# Patient Record
Sex: FEMALE | Race: WHITE | NOT HISPANIC OR LATINO | URBAN - METROPOLITAN AREA
[De-identification: names, ages, dates, MRNs, and addresses within clinical notes are randomized per-mention and may not be internally consistent; named-entity substitution may affect disease eponyms.]

---

## 2017-05-25 ENCOUNTER — INPATIENT (INPATIENT)
Facility: HOSPITAL | Age: 57
LOS: 0 days | Discharge: HOME | End: 2017-05-25
Attending: INTERNAL MEDICINE

## 2017-06-28 DIAGNOSIS — E03.9 HYPOTHYROIDISM, UNSPECIFIED: ICD-10-CM

## 2017-06-28 DIAGNOSIS — I10 ESSENTIAL (PRIMARY) HYPERTENSION: ICD-10-CM

## 2017-06-28 DIAGNOSIS — R42 DIZZINESS AND GIDDINESS: ICD-10-CM

## 2017-06-28 DIAGNOSIS — R11.0 NAUSEA: ICD-10-CM

## 2017-06-28 DIAGNOSIS — E78.5 HYPERLIPIDEMIA, UNSPECIFIED: ICD-10-CM

## 2017-06-28 DIAGNOSIS — E11.649 TYPE 2 DIABETES MELLITUS WITH HYPOGLYCEMIA WITHOUT COMA: ICD-10-CM

## 2019-11-19 PROBLEM — Z00.00 ENCOUNTER FOR PREVENTIVE HEALTH EXAMINATION: Status: ACTIVE | Noted: 2019-11-19

## 2019-11-25 ENCOUNTER — APPOINTMENT (OUTPATIENT)
Dept: OBGYN | Facility: CLINIC | Age: 59
End: 2019-11-25
Payer: COMMERCIAL

## 2019-11-25 VITALS
DIASTOLIC BLOOD PRESSURE: 80 MMHG | SYSTOLIC BLOOD PRESSURE: 130 MMHG | BODY MASS INDEX: 30.02 KG/M2 | HEIGHT: 61 IN | WEIGHT: 159 LBS

## 2019-11-25 DIAGNOSIS — R87.611 ATYPICAL SQUAMOUS CELLS CANNOT EXCLUDE HIGH GRADE SQUAMOUS INTRAEPITHELIAL LESION ON CYTOLOGIC SMEAR OF CERVIX (ASC-H): ICD-10-CM

## 2019-11-25 DIAGNOSIS — Z86.19 PERSONAL HISTORY OF OTHER INFECTIOUS AND PARASITIC DISEASES: ICD-10-CM

## 2019-11-25 DIAGNOSIS — R87.629 UNSPECIFIED ABNORMAL CYTOLOGICAL FINDINGS IN SPECIMENS FROM VAGINA: ICD-10-CM

## 2019-11-25 PROCEDURE — 99396 PREV VISIT EST AGE 40-64: CPT

## 2019-11-25 NOTE — PHYSICAL EXAM
[Awake] : awake [Alert] : alert [Acute Distress] : no acute distress [Mass] : no breast mass [Nipple Discharge] : no nipple discharge [Axillary LAD] : no axillary lymphadenopathy [Soft] : soft [Tender] : non tender [Oriented x3] : oriented to person, place, and time [Atrophy] : atrophy [Normal] : uterus [No Bleeding] : there was no active vaginal bleeding [Uterine Adnexae] : were not tender and not enlarged

## 2020-05-02 RX ORDER — CLOTRIMAZOLE 10 MG/G
1 CREAM TOPICAL TWICE DAILY
Qty: 1 | Refills: 0 | Status: ACTIVE | COMMUNITY
Start: 2020-05-02 | End: 1900-01-01

## 2020-08-06 DIAGNOSIS — N76.2 ACUTE VULVITIS: ICD-10-CM

## 2020-08-06 RX ORDER — CLOTRIMAZOLE AND BETAMETHASONE DIPROPIONATE 10; .5 MG/G; MG/G
1-0.05 CREAM TOPICAL TWICE DAILY
Qty: 1 | Refills: 0 | Status: ACTIVE | COMMUNITY
Start: 2020-08-06 | End: 1900-01-01

## 2020-12-01 ENCOUNTER — APPOINTMENT (OUTPATIENT)
Dept: OBGYN | Facility: CLINIC | Age: 60
End: 2020-12-01
Payer: COMMERCIAL

## 2020-12-01 VITALS
WEIGHT: 151 LBS | BODY MASS INDEX: 28.53 KG/M2 | TEMPERATURE: 97.2 F | DIASTOLIC BLOOD PRESSURE: 72 MMHG | SYSTOLIC BLOOD PRESSURE: 126 MMHG

## 2020-12-01 DIAGNOSIS — Z01.419 ENCOUNTER FOR GYNECOLOGICAL EXAMINATION (GENERAL) (ROUTINE) W/OUT ABNORMAL FINDINGS: ICD-10-CM

## 2020-12-01 PROCEDURE — 99072 ADDL SUPL MATRL&STAF TM PHE: CPT

## 2020-12-01 PROCEDURE — 99396 PREV VISIT EST AGE 40-64: CPT

## 2020-12-01 NOTE — DISCUSSION/SUMMARY
[FreeTextEntry1] : Pap done \par Self breast exam stressed\par Prescribed bilateral screening mammogram\par Follow-up yearly or as needed\par \par

## 2020-12-01 NOTE — HISTORY OF PRESENT ILLNESS
[FreeTextEntry1] : Patient is 60 years old para 2-0-0-2 last menstrual period 2011\par She denies postmenopausal bleeding and is presently without complaints no fever/no bruising/no pain/no decreased eating/drinking/no petechia/no vomiting/no chills/no scaly patches on skin

## 2021-05-04 RX ORDER — CLOTRIMAZOLE AND BETAMETHASONE DIPROPIONATE 10; .5 MG/G; MG/G
1-0.05 CREAM TOPICAL TWICE DAILY
Qty: 1 | Refills: 0 | Status: ACTIVE | COMMUNITY
Start: 2021-05-04 | End: 1900-01-01

## 2021-07-12 ENCOUNTER — APPOINTMENT (OUTPATIENT)
Dept: ENDOCRINOLOGY | Facility: CLINIC | Age: 61
End: 2021-07-12
Payer: COMMERCIAL

## 2021-07-12 VITALS
BODY MASS INDEX: 28.32 KG/M2 | SYSTOLIC BLOOD PRESSURE: 128 MMHG | TEMPERATURE: 97.6 F | DIASTOLIC BLOOD PRESSURE: 70 MMHG | HEART RATE: 70 BPM | HEIGHT: 61 IN | OXYGEN SATURATION: 98 % | WEIGHT: 150 LBS

## 2021-07-12 PROCEDURE — 99203 OFFICE O/P NEW LOW 30 MIN: CPT

## 2021-07-12 PROCEDURE — 99072 ADDL SUPL MATRL&STAF TM PHE: CPT

## 2021-07-12 NOTE — DATA REVIEWED
[FreeTextEntry1] : 6/10/21:  Tg 160 glucose 101 HBA1c 5.9% crea 0.83    GFR 76  GGT 96  ALK phos 68 AST 11 ALt 17 TSH 5.18  FT4 1.4  25 vit D 43

## 2021-07-12 NOTE — HISTORY OF PRESENT ILLNESS
[FreeTextEntry1] : 60 year old patient who present for evaluation of hypothyroidism , DL, and prediabetes \par \par thyroid history : \par - 9 months after she gave birth was diagnosed with hypothyroidism , since that time she is on synthroid ( brand )100 mcg daily , dose was changed 3 months ago from 112 to 100 mcg .\par Now feel good , euthyroid , no fatigue, weight gain, cold intolerance, no constipation. \par \par DL : high LDL and Tg was on statin but stopped it as she started Keto diet , lost weight and her numbers ( Tg got better m, LDL increased) she i snot convinced that her DL will cause her any heart disease and prefer to stay off.\par \par prediabetes \par previous HBa1c 6.4%

## 2021-07-12 NOTE — PHYSICAL EXAM
[Alert] : alert [Healthy Appearance] : healthy appearance [No Acute Distress] : no acute distress [No Proptosis] : no proptosis [No Lid Lag] : no lid lag [Thyroid Not Enlarged] : the thyroid was not enlarged [No Thyroid Nodules] : no palpable thyroid nodules [No Respiratory Distress] : no respiratory distress [No Accessory Muscle Use] : no accessory muscle use [Clear to Auscultation] : lungs were clear to auscultation bilaterally [Normal S1, S2] : normal S1 and S2 [Regular Rhythm] : with a regular rhythm [No Edema] : no peripheral edema [Not Tender] : non-tender [Not Distended] : not distended [Soft] : abdomen soft [No CVA Tenderness] : no ~M costovertebral angle tenderness [No Stigmata of Cushings Syndrome] : no stigmata of Cushings Syndrome [Cranial Nerves Intact] : cranial nerves 2-12 were intact [No Motor Deficits] : the motor exam was normal [No Tremors] : no tremors [Oriented x3] : oriented to person, place, and time

## 2021-07-12 NOTE — ASSESSMENT
[Carbohydrate Consistent Diet] : carbohydrate consistent diet [Importance of Diet and Exercise] : importance of diet and exercise to improve glycemic control, achieve weight loss and improve cardiovascular health [Exercise/Effect on Glucose] : exercise/effect on glucose [Self Monitoring of Blood Glucose] : self monitoring of blood glucose [FreeTextEntry1] : 60 year old patient who present for evaluation of hypothyroidism , DL, and prediabetes \par \par \par \par #hypothyroidism \par - on synthroid 100 mcg daily ( brand name ) , compliant , no biotin use, TSH 5.18 normal Ft4 , clinically euthyroid , dose lowered previously for palpitations, patient prefer to stay on current dose , will recheck labs before next visit and adjust \par \par #DL : high LDL and Tg was on statin but stopped it as she started Keto diet , lost weight and her numbers ( Tg got better m, LDL increased) she i snot convinced that her DL will cause her any heart disease and prefer to stay off.\par state she had Ct Coronaries done , will request from dr Desai office . \par advised about risk of high cholesterol with keto diet and advised to avoid saturated fat \par refuse to go back on statin for now \par state that with weight loss her fatty liver also is better, ( has high GGT , unclear etiology )\par \par # prediabetes : hba1c 5.9% used to take metformin but stopped , prefer not to go back on it \par advised to continue low carb diet, healthy lifestyle and exercise

## 2021-12-14 ENCOUNTER — APPOINTMENT (OUTPATIENT)
Dept: ENDOCRINOLOGY | Facility: CLINIC | Age: 61
End: 2021-12-14
Payer: COMMERCIAL

## 2021-12-14 VITALS
OXYGEN SATURATION: 98 % | BODY MASS INDEX: 28.89 KG/M2 | HEIGHT: 61 IN | HEART RATE: 59 BPM | TEMPERATURE: 97.2 F | DIASTOLIC BLOOD PRESSURE: 70 MMHG | WEIGHT: 153 LBS | SYSTOLIC BLOOD PRESSURE: 120 MMHG

## 2021-12-14 PROCEDURE — 99213 OFFICE O/P EST LOW 20 MIN: CPT

## 2021-12-14 NOTE — DATA REVIEWED
[FreeTextEntry1] : 6/10/21:  Tg 160 glucose 101 HBA1c 5.9% crea 0.83    GFR 76  GGT 96  ALK phos 68 AST 11 ALt 17 TSH 5.18  FT4 1.4  25 vit D 43 \par \par 12/2/21: tsh 1.81  Ft4 1.6  TT3 78 hb 12.6 A1c 6% crea 0.71 GFR 91  tg 174 ALT 30 AST 14

## 2021-12-14 NOTE — ASSESSMENT
[Carbohydrate Consistent Diet] : carbohydrate consistent diet [Importance of Diet and Exercise] : importance of diet and exercise to improve glycemic control, achieve weight loss and improve cardiovascular health [Exercise/Effect on Glucose] : exercise/effect on glucose [Self Monitoring of Blood Glucose] : self monitoring of blood glucose [FreeTextEntry1] : 60 year old patient who present for evaluation of hypothyroidism , DL, and prediabetes \par \par \par \par #hypothyroidism \par - on synthroid 100 mcg daily ( brand name ) , compliant , no biotin use, tft's ok continue same dose \par \par #DL : high LDL and Tg was on statin but stopped it as she started Keto diet , lost weight and her numbers got better but now worse she is not convinced that her LDL will cause her any heart disease and prefer to stay off.\par state she had Ct Coronaries done  with PCP and will have another one done soon \par \par refuse to go back on statin for now , is ok using vascepa , doesn't want to use Zetia \par \par # prediabetes : hba1c 6% used to take metformin but stopped , prefer not to go back on it \par advised to continue low carb diet, healthy lifestyle and exercise

## 2021-12-14 NOTE — HISTORY OF PRESENT ILLNESS
[FreeTextEntry1] : 61 year old patient who present for evaluation of hypothyroidism , DL, and prediabetes \par \par thyroid history : \par - 9 months after she gave birth was diagnosed with hypothyroidism , since that time she is on synthroid ( brand )100 mcg daily , dose was changed 3 months ago from 112 to 100 mcg .\par Now feel good , euthyroid , no fatigue, weight gain, cold intolerance, no constipation. \par \par DL : high LDL and Tg was on statin but stopped it as she started Keto diet , she is not convinced that her LDL will cause her any heart disease and prefer to stay off.\par \par prediabetes \par previous HBa1c 6.4% \par \par 12/2021: patient feel ok she stopped her red Rice yeast  and fish oil and was not compliant with diet fully continue te refuse statin \par remain on Synthroid 100 mcg daily

## 2022-01-13 ENCOUNTER — APPOINTMENT (OUTPATIENT)
Dept: OBGYN | Facility: CLINIC | Age: 62
End: 2022-01-13
Payer: COMMERCIAL

## 2022-01-13 VITALS — BODY MASS INDEX: 28.91 KG/M2 | DIASTOLIC BLOOD PRESSURE: 74 MMHG | SYSTOLIC BLOOD PRESSURE: 118 MMHG | WEIGHT: 153 LBS

## 2022-01-13 DIAGNOSIS — Z01.419 ENCOUNTER FOR GYNECOLOGICAL EXAMINATION (GENERAL) (ROUTINE) W/OUT ABNORMAL FINDINGS: ICD-10-CM

## 2022-01-13 PROCEDURE — 99396 PREV VISIT EST AGE 40-64: CPT

## 2022-01-13 RX ORDER — CLOTRIMAZOLE AND BETAMETHASONE DIPROPIONATE 10; .5 MG/G; MG/G
1-0.05 CREAM TOPICAL TWICE DAILY
Qty: 1 | Refills: 1 | Status: ACTIVE | COMMUNITY
Start: 2022-01-13 | End: 1900-01-01

## 2022-01-13 NOTE — DISCUSSION/SUMMARY
[FreeTextEntry1] : Pap done\par Self breast exam stressed\par Prescribed yearly bilateral screening mammogram and bilateral breast ultrasound\par Follow-up yearly or as needed

## 2022-01-13 NOTE — HISTORY OF PRESENT ILLNESS
[FreeTextEntry1] : Patient is 61 years old para 2-0-0-2 last menstrual period 2011.\par She denies postmenopausal bleeding and is presently without complaints.

## 2022-02-15 RX ORDER — CLOTRIMAZOLE AND BETAMETHASONE DIPROPIONATE 10; .5 MG/G; MG/G
1-0.05 CREAM TOPICAL TWICE DAILY
Qty: 1 | Refills: 1 | Status: ACTIVE | COMMUNITY
Start: 2022-02-15 | End: 1900-01-01

## 2022-03-31 ENCOUNTER — OUTPATIENT (OUTPATIENT)
Dept: OUTPATIENT SERVICES | Facility: HOSPITAL | Age: 62
LOS: 1 days | Discharge: HOME | End: 2022-03-31
Payer: COMMERCIAL

## 2022-03-31 ENCOUNTER — RESULT REVIEW (OUTPATIENT)
Age: 62
End: 2022-03-31

## 2022-03-31 DIAGNOSIS — R07.9 CHEST PAIN, UNSPECIFIED: ICD-10-CM

## 2022-03-31 PROCEDURE — 75574 CT ANGIO HRT W/3D IMAGE: CPT | Mod: 26

## 2022-06-20 ENCOUNTER — APPOINTMENT (OUTPATIENT)
Dept: ENDOCRINOLOGY | Facility: CLINIC | Age: 62
End: 2022-06-20

## 2022-07-24 ENCOUNTER — RX RENEWAL (OUTPATIENT)
Age: 62
End: 2022-07-24

## 2022-10-06 ENCOUNTER — APPOINTMENT (OUTPATIENT)
Dept: ENDOCRINOLOGY | Facility: CLINIC | Age: 62
End: 2022-10-06

## 2022-10-06 VITALS
WEIGHT: 155 LBS | BODY MASS INDEX: 29.27 KG/M2 | SYSTOLIC BLOOD PRESSURE: 138 MMHG | DIASTOLIC BLOOD PRESSURE: 68 MMHG | HEIGHT: 61 IN

## 2022-10-06 PROCEDURE — 99213 OFFICE O/P EST LOW 20 MIN: CPT

## 2022-10-06 NOTE — DISCUSSION/SUMMARY
[FreeTextEntry1] : patient seen at office ( has appointment with GYN ) states her BG on FS are running in 106-130 range and would like to restart metformin \par will restart metformin 500 mg daily

## 2022-10-07 NOTE — DATA REVIEWED
[FreeTextEntry1] : 6/10/21:  Tg 160 glucose 101 HBA1c 5.9% crea 0.83    GFR 76  GGT 96  ALK phos 68 AST 11 ALt 17 TSH 5.18  FT4 1.4  25 vit D 43 \par \par 12/2/21: tsh 1.81  Ft4 1.6  TT3 78 hb 12.6 A1c 6% crea 0.71 GFR 91  tg 174 ALT 30 AST 14 \par 9/2022: A1c 6.1% LDL 92 Tg 145 glucose 108 crea 0.69  GFR 97 TSH 1.1 25 vit D 51

## 2022-10-07 NOTE — ASSESSMENT
[Carbohydrate Consistent Diet] : carbohydrate consistent diet [Importance of Diet and Exercise] : importance of diet and exercise to improve glycemic control, achieve weight loss and improve cardiovascular health [Exercise/Effect on Glucose] : exercise/effect on glucose [Self Monitoring of Blood Glucose] : self monitoring of blood glucose [FreeTextEntry1] : 61 year old patient who present for evaluation of hypothyroidism , DL, and prediabetes \par \par \par \par #hypothyroidism \par - on Synthroid 100 mcg daily ( brand name ) , compliant , no biotin use, tft's ok continue same dose \par \par #DL : high LDL and Tg was on statin but stopped it as she started Keto diet , lost weight and her numbers got better but then worse, was started on crestor 20 mg daily as of 5/2022  and now LDL better , also on vascepa \par \par \par \par \par # prediabetes : hba1c 6.1% on metformin 500 mg BID and diet and lifestyle changes

## 2022-10-07 NOTE — HISTORY OF PRESENT ILLNESS
[FreeTextEntry1] : 61 year old patient who present for evaluation of hypothyroidism , DL, and prediabetes \par \par thyroid history : \par - 9 months after she gave birth was diagnosed with hypothyroidism , since that time she is on synthroid ( brand )100 mcg daily , dose was changed 3 months ago from 112 to 100 mcg .\par Now feel good , euthyroid , no fatigue, weight gain, cold intolerance, no constipation. \par \par DL : high LDL and Tg was on statin but stopped it as she started Keto diet .\par restarted now on crestor 20 mg daily after seeing cardiology \par \par prediabetes\par on metformin 500 mg BID  \par previous HBa1c 6.4% \par \par

## 2023-01-17 ENCOUNTER — APPOINTMENT (OUTPATIENT)
Dept: OBGYN | Facility: CLINIC | Age: 63
End: 2023-01-17
Payer: COMMERCIAL

## 2023-01-17 VITALS — SYSTOLIC BLOOD PRESSURE: 124 MMHG | WEIGHT: 162 LBS | DIASTOLIC BLOOD PRESSURE: 72 MMHG | BODY MASS INDEX: 30.61 KG/M2

## 2023-01-17 DIAGNOSIS — Z01.419 ENCOUNTER FOR GYNECOLOGICAL EXAMINATION (GENERAL) (ROUTINE) W/OUT ABNORMAL FINDINGS: ICD-10-CM

## 2023-01-17 PROCEDURE — 99396 PREV VISIT EST AGE 40-64: CPT

## 2023-01-17 NOTE — HISTORY OF PRESENT ILLNESS
[FreeTextEntry1] : Patient is 62 years old para 2-0-0-2 last menstrual period 2011.\par She denies postmenopausal bleeding and is presently without complaints

## 2023-03-14 ENCOUNTER — APPOINTMENT (OUTPATIENT)
Dept: ENDOCRINOLOGY | Facility: CLINIC | Age: 63
End: 2023-03-14
Payer: COMMERCIAL

## 2023-03-14 VITALS
HEIGHT: 61 IN | TEMPERATURE: 97.4 F | BODY MASS INDEX: 30.96 KG/M2 | OXYGEN SATURATION: 98 % | HEART RATE: 62 BPM | WEIGHT: 164 LBS | DIASTOLIC BLOOD PRESSURE: 80 MMHG | SYSTOLIC BLOOD PRESSURE: 138 MMHG

## 2023-03-14 PROCEDURE — 99213 OFFICE O/P EST LOW 20 MIN: CPT

## 2023-03-14 NOTE — HISTORY OF PRESENT ILLNESS
[FreeTextEntry1] : 62 year old patient who present for evaluation of hypothyroidism , DL, and prediabetes \par \par #thyroid history : \par - 9 months after she gave birth was diagnosed with hypothyroidism , since that time she is on Synthroid ( brand )100 mcg daily \par Now feel good , euthyroid , no fatigue, + weight gain, cold intolerance, no constipation. \par \par #DL : controlled now on crestor 20 mg and vascepa \par \par #prediabetes\par on metformin 500 mg BID  \par  \par \par

## 2023-03-14 NOTE — DATA REVIEWED
[FreeTextEntry1] : 6/10/21:  Tg 160 glucose 101 HBA1c 5.9% crea 0.83    GFR 76  GGT 96  ALK phos 68 AST 11 ALt 17 TSH 5.18  FT4 1.4  25 vit D 43 \par \par 12/2/21: tsh 1.81  Ft4 1.6  TT3 78 hb 12.6 A1c 6% crea 0.71 GFR 91  tg 174 ALT 30 AST 14 \par 9/2022: A1c 6.1% LDL 92 Tg 145 glucose 108 crea 0.69  GFR 97 TSH 1.1 25 vit D 51 \par \par 3/2023:  A1c 6.4% glucose 134   crea 0.72  GFR 93  crea 0.72   AST 11  ALT 18 LDL 74  Tg 112   \par U/ A positive no bacteria ?  TSH 1.22 ft4 1.6 \par

## 2023-03-14 NOTE — ASSESSMENT
[Carbohydrate Consistent Diet] : carbohydrate consistent diet [Importance of Diet and Exercise] : importance of diet and exercise to improve glycemic control, achieve weight loss and improve cardiovascular health [Exercise/Effect on Glucose] : exercise/effect on glucose [Self Monitoring of Blood Glucose] : self monitoring of blood glucose [FreeTextEntry1] : 62 year old patient who present for evaluation of hypothyroidism , DL, and prediabetes \par \par \par \par #hypothyroidism \par - on Synthroid 100 mcg daily ( brand name ) , compliant , no biotin use, tft's ok continue same dose \par \par #DL : improved  on crestor 20 mg daily   and now LDL better , also on vascepa \par \par \par \par \par # prediabetes : hba1c 6.4% increase metformin to 500 mg in am and 1000 mg in pm  diet and lifestyle changes \par \par # U?a + albuminuria ? patient will follow up with PCP if having symptoms to have U/A and culture done

## 2023-03-14 NOTE — REVIEW OF SYSTEMS
Attempted to call report. Placed on hold. Will attempt to call report again shortly. [All other systems negative] : All other systems negative [Fatigue] : no fatigue [Recent Weight Gain (___ Lbs)] : recent weight gain: [unfilled] lbs [Visual Field Defect] : no visual field defect [Blurred Vision] : no blurred vision [Dysphagia] : no dysphagia [Neck Pain] : no neck pain [Dysphonia] : no dysphonia [Chest Pain] : no chest pain [Palpitations] : no palpitations [Fast Heart Rate] : heart rate is not fast [Lower Ext Edema] : no lower extremity edema [Shortness Of Breath] : no shortness of breath [SOB on Exertion] : no shortness of breath on exertion [Nausea] : no nausea [Constipation] : no constipation [Vomiting] : no vomiting [As Noted in HPI] : as noted in HPI

## 2023-06-30 RX ORDER — LEVOTHYROXINE SODIUM 0.1 MG/1
100 TABLET ORAL
Qty: 90 | Refills: 1 | Status: ACTIVE | COMMUNITY
Start: 2021-07-12 | End: 1900-01-01

## 2023-08-15 ENCOUNTER — APPOINTMENT (OUTPATIENT)
Dept: ENDOCRINOLOGY | Facility: CLINIC | Age: 63
End: 2023-08-15
Payer: COMMERCIAL

## 2023-08-15 VITALS
HEART RATE: 60 BPM | DIASTOLIC BLOOD PRESSURE: 74 MMHG | SYSTOLIC BLOOD PRESSURE: 128 MMHG | HEIGHT: 61 IN | OXYGEN SATURATION: 98 % | BODY MASS INDEX: 30.4 KG/M2 | WEIGHT: 161 LBS

## 2023-08-15 PROCEDURE — 99213 OFFICE O/P EST LOW 20 MIN: CPT

## 2023-08-15 RX ORDER — ICOSAPENT ETHYL 1 G/1
1 CAPSULE ORAL
Qty: 360 | Refills: 2 | Status: ACTIVE | COMMUNITY
Start: 2021-12-14 | End: 1900-01-01

## 2023-08-15 NOTE — PHYSICAL EXAM
[Alert] : alert [Healthy Appearance] : healthy appearance [No Acute Distress] : no acute distress [No Proptosis] : no proptosis [No Lid Lag] : no lid lag [Thyroid Not Enlarged] : the thyroid was not enlarged [No Thyroid Nodules] : no palpable thyroid nodules [No Respiratory Distress] : no respiratory distress [No Accessory Muscle Use] : no accessory muscle use [Clear to Auscultation] : lungs were clear to auscultation bilaterally [Normal S1, S2] : normal S1 and S2 [No Murmurs] : no murmurs [Regular Rhythm] : with a regular rhythm [No Edema] : no peripheral edema [No CVA Tenderness] : no ~M costovertebral angle tenderness [No Stigmata of Cushings Syndrome] : no stigmata of Cushings Syndrome [Cranial Nerves Intact] : cranial nerves 2-12 were intact [No Motor Deficits] : the motor exam was normal [No Tremors] : no tremors [Oriented x3] : oriented to person, place, and time

## 2023-08-15 NOTE — DATA REVIEWED
[FreeTextEntry1] : 6/10/21:  Tg 160 glucose 101 HBA1c 5.9% crea 0.83    GFR 76  GGT 96  ALK phos 68 AST 11 ALt 17 TSH 5.18  FT4 1.4  25 vit D 43   12/2/21: tsh 1.81  Ft4 1.6  TT3 78 hb 12.6 A1c 6% crea 0.71 GFR 91  tg 174 ALT 30 AST 14  9/2022: A1c 6.1% LDL 92 Tg 145 glucose 108 crea 0.69  GFR 97 TSH 1.1 25 vit D 51   3/2023:  A1c 6.4% glucose 134   crea 0.72  GFR 93  crea 0.72   AST 11  ALT 18 LDL 74  Tg 112    U/ A positive no bacteria ?  TSH 1.22 ft4 1.6   8/2023: A1c 6.3% LDL 86  Tg 159 glucose 125 crea 0.72   GFR 93

## 2023-08-15 NOTE — HISTORY OF PRESENT ILLNESS
[FreeTextEntry1] : 62 year old patient who present for evaluation of hypothyroidism , DL, and prediabetes   #thyroid history :  - 9 months after she gave birth was diagnosed with hypothyroidism , since that time she is on Synthroid ( brand )100 mcg daily  Now feel good , euthyroid , no fatigue, no weight gain, cold intolerance, no constipation.   #DL : controlled now on crestor 20 mg and vascepa   #prediabetes on metformin 500 mg ER 1 in am and 2 in pm

## 2023-08-15 NOTE — REVIEW OF SYSTEMS
[Recent Weight Gain (___ Lbs)] : recent weight gain: [unfilled] lbs [As Noted in HPI] : as noted in HPI [All other systems negative] : All other systems negative [Fatigue] : no fatigue [Recent Weight Loss (___ Lbs)] : recent weight loss: [unfilled] lbs [Visual Field Defect] : no visual field defect [Blurred Vision] : no blurred vision [Dysphagia] : no dysphagia [Neck Pain] : no neck pain [Dysphonia] : no dysphonia [Chest Pain] : no chest pain [Palpitations] : no palpitations [Fast Heart Rate] : heart rate is not fast [Lower Ext Edema] : no lower extremity edema [Shortness Of Breath] : no shortness of breath [SOB on Exertion] : no shortness of breath on exertion [Nausea] : no nausea [Constipation] : no constipation [Vomiting] : no vomiting [Diarrhea] : diarrhea

## 2023-08-15 NOTE — ASSESSMENT
[Carbohydrate Consistent Diet] : carbohydrate consistent diet [Importance of Diet and Exercise] : importance of diet and exercise to improve glycemic control, achieve weight loss and improve cardiovascular health [Exercise/Effect on Glucose] : exercise/effect on glucose [Self Monitoring of Blood Glucose] : self monitoring of blood glucose [FreeTextEntry1] : 62-year-old patient who present for follow up evaluation of hypothyroidism , DL, and prediabetes     #hypothyroidism  - on Synthroid 100 mcg daily (brand name) , compliant, no biotin use, tft's not done with current labs continue same dose now, recheck with next BW   #DL : improved  on crestor 20 mg daily   and now LDL better , also on vascepa      # prediabetes : hba1c 6.3% on metformin to 500 mg in am and 1000 mg in pm diet and lifestyle changes, has diarrhea, discussed change in regimen, but prefer to stay the same now as traveling

## 2023-08-31 ENCOUNTER — RX RENEWAL (OUTPATIENT)
Age: 63
End: 2023-08-31

## 2023-11-13 ENCOUNTER — APPOINTMENT (OUTPATIENT)
Dept: ENDOCRINOLOGY | Facility: CLINIC | Age: 63
End: 2023-11-13
Payer: COMMERCIAL

## 2023-11-13 VITALS
DIASTOLIC BLOOD PRESSURE: 74 MMHG | WEIGHT: 169 LBS | HEIGHT: 61 IN | BODY MASS INDEX: 31.91 KG/M2 | SYSTOLIC BLOOD PRESSURE: 132 MMHG | OXYGEN SATURATION: 98 % | HEART RATE: 88 BPM

## 2023-11-13 DIAGNOSIS — R19.7 DIARRHEA, UNSPECIFIED: ICD-10-CM

## 2023-11-13 PROCEDURE — 99213 OFFICE O/P EST LOW 20 MIN: CPT

## 2024-03-07 ENCOUNTER — RX RENEWAL (OUTPATIENT)
Age: 64
End: 2024-03-07

## 2024-04-17 ENCOUNTER — APPOINTMENT (OUTPATIENT)
Dept: ENDOCRINOLOGY | Facility: CLINIC | Age: 64
End: 2024-04-17
Payer: COMMERCIAL

## 2024-04-17 VITALS
WEIGHT: 165 LBS | HEART RATE: 82 BPM | BODY MASS INDEX: 31.15 KG/M2 | OXYGEN SATURATION: 98 % | SYSTOLIC BLOOD PRESSURE: 135 MMHG | DIASTOLIC BLOOD PRESSURE: 70 MMHG | HEIGHT: 61 IN

## 2024-04-17 DIAGNOSIS — E78.5 HYPERLIPIDEMIA, UNSPECIFIED: ICD-10-CM

## 2024-04-17 DIAGNOSIS — E03.9 HYPOTHYROIDISM, UNSPECIFIED: ICD-10-CM

## 2024-04-17 DIAGNOSIS — R73.03 PREDIABETES.: ICD-10-CM

## 2024-04-17 PROCEDURE — 99213 OFFICE O/P EST LOW 20 MIN: CPT

## 2024-04-17 RX ORDER — METFORMIN ER 500 MG 500 MG/1
500 TABLET ORAL
Qty: 270 | Refills: 1 | Status: DISCONTINUED | COMMUNITY
Start: 2023-05-04 | End: 2024-04-17

## 2024-04-17 RX ORDER — METFORMIN HYDROCHLORIDE 500 MG/1
500 TABLET, COATED ORAL
Qty: 90 | Refills: 1 | Status: DISCONTINUED | COMMUNITY
Start: 2022-01-13 | End: 2024-04-17

## 2024-04-17 RX ORDER — SITAGLIPTIN AND METFORMIN HYDROCHLORIDE 50; 1000 MG/1; MG/1
50-1000 TABLET, FILM COATED, EXTENDED RELEASE ORAL
Qty: 180 | Refills: 2 | Status: ACTIVE | COMMUNITY
Start: 2023-11-13 | End: 1900-01-01

## 2024-04-17 NOTE — DATA REVIEWED
[FreeTextEntry1] : 6/10/21:  Tg 160 glucose 101 HBA1c 5.9% crea 0.83    GFR 76  GGT 96  ALK phos 68 AST 11 ALt 17 TSH 5.18  FT4 1.4  25 vit D 43   12/2/21: tsh 1.81  Ft4 1.6  TT3 78 hb 12.6 A1c 6% crea 0.71 GFR 91  tg 174 ALT 30 AST 14  9/2022: A1c 6.1% LDL 92 Tg 145 glucose 108 crea 0.69  GFR 97 TSH 1.1 25 vit D 51   3/2023:  A1c 6.4% glucose 134   crea 0.72  GFR 93  crea 0.72   AST 11  ALT 18 LDL 74  Tg 112    U/ A positive no bacteria ?  TSH 1.22 ft4 1.6  8/2023: A1c 6.3% LDL 86  Tg 159 glucose 125 crea 0.72   GFR 93  11/2023: A1c 6.4% ACR 34 LDL 89 Tg 194 glucose 122  crea 0.72  GFR 93 TSH 1.25  ft4 1.6   4/2024: A1c 6.6%  glucose 116  LDL 75   crea 0.72  GFR 93 TSH 1.01  Ft4 1.6  ACR 43

## 2024-04-17 NOTE — ASSESSMENT
[Carbohydrate Consistent Diet] : carbohydrate consistent diet [Importance of Diet and Exercise] : importance of diet and exercise to improve glycemic control, achieve weight loss and improve cardiovascular health [Exercise/Effect on Glucose] : exercise/effect on glucose [Self Monitoring of Blood Glucose] : self monitoring of blood glucose [FreeTextEntry1] : 63-year-old patient who present for follow up evaluation of hypothyroidism , DL, and prediabetes    #hypothyroidism - on Synthroid 100 mcg daily (brand name) , compliant, no biotin use, tft's ok   continue same dose   #DL :  on crestor 20 mg daily and now LDL better , also on vascepa  tg mildly elevated    # prediabetes now   hba1c 6.6% on Janumet  XR 50/1000 OD , as was having diarrhea with metformin  - increase janumet XR to 50/1000 mg BID and monitor  # albuminuria : monitor BP at home, will recheck and consider ace inh

## 2024-04-17 NOTE — PHYSICAL EXAM
[Alert] : alert [Healthy Appearance] : healthy appearance [No Acute Distress] : no acute distress [No Proptosis] : no proptosis [No Lid Lag] : no lid lag [Thyroid Not Enlarged] : the thyroid was not enlarged [No Thyroid Nodules] : no palpable thyroid nodules [No Respiratory Distress] : no respiratory distress [No Accessory Muscle Use] : no accessory muscle use [Clear to Auscultation] : lungs were clear to auscultation bilaterally [Normal S1, S2] : normal S1 and S2 [No Murmurs] : no murmurs [Regular Rhythm] : with a regular rhythm [No Edema] : no peripheral edema [No CVA Tenderness] : no ~M costovertebral angle tenderness [No Stigmata of Cushings Syndrome] : no stigmata of Cushings Syndrome [Cranial Nerves Intact] : cranial nerves 2-12 were intact [No Motor Deficits] : the motor exam was normal [No Tremors] : no tremors [Oriented x3] : oriented to person, place, and time [de-identified] : BMI31

## 2024-04-17 NOTE — HISTORY OF PRESENT ILLNESS
[FreeTextEntry1] : 63 year old patient who present for follow up  evaluation of hypothyroidism , DL, and prediabetes   #thyroid history :  - 9 months after she gave birth was diagnosed with hypothyroidism , since that time she is on Synthroid ( brand )100 mcg daily  Now feel good , euthyroid , no fatigue, no weight gain, cold intolerance, no constipation.   #DL : controlled now on crestor 20 mg and vascepa   #prediabetes on janumet 50/1000 XR daily , no more side effects A1c 6.6%

## 2024-04-17 NOTE — REVIEW OF SYSTEMS
[Recent Weight Gain (___ Lbs)] : recent weight gain: [unfilled] lbs [Diarrhea] : diarrhea [As Noted in HPI] : as noted in HPI [All other systems negative] : All other systems negative [Fatigue] : no fatigue [Recent Weight Loss (___ Lbs)] : no recent weight loss [Visual Field Defect] : no visual field defect [Blurred Vision] : no blurred vision [Dysphagia] : no dysphagia [Neck Pain] : no neck pain [Dysphonia] : no dysphonia [Chest Pain] : no chest pain [Palpitations] : no palpitations [Fast Heart Rate] : heart rate is not fast [Lower Ext Edema] : no lower extremity edema [Shortness Of Breath] : no shortness of breath [SOB on Exertion] : no shortness of breath on exertion [Nausea] : no nausea [Constipation] : no constipation [Vomiting] : no vomiting

## 2024-08-20 ENCOUNTER — APPOINTMENT (OUTPATIENT)
Dept: ENDOCRINOLOGY | Facility: CLINIC | Age: 64
End: 2024-08-20
Payer: COMMERCIAL

## 2024-08-20 DIAGNOSIS — E03.9 HYPOTHYROIDISM, UNSPECIFIED: ICD-10-CM

## 2024-08-20 DIAGNOSIS — R73.03 PREDIABETES.: ICD-10-CM

## 2024-08-20 DIAGNOSIS — E78.5 HYPERLIPIDEMIA, UNSPECIFIED: ICD-10-CM

## 2024-08-20 PROCEDURE — 99213 OFFICE O/P EST LOW 20 MIN: CPT

## 2024-08-20 RX ORDER — ROSUVASTATIN CALCIUM 20 MG/1
20 TABLET, FILM COATED ORAL
Qty: 90 | Refills: 1 | Status: ACTIVE | COMMUNITY
Start: 2024-08-20 | End: 1900-01-01

## 2024-08-20 NOTE — HISTORY OF PRESENT ILLNESS
[FreeTextEntry1] : 63 year old patient who present for follow up evaluation of hypothyroidism, DL, and prediabetes   #hypothyroidism due to hashimoto   - 9 months after she gave birth was diagnosed with hypothyroidism , since that time she is on Synthroid ( brand )100 mcg daily  Now feel good , euthyroid , no fatigue, no weight gain, cold intolerance, no constipation.   #DL : controlled now on crestor 20 mg and vascepa   #prediabetes - on janumet 50/1000 XR daily , no more side effects A1c 6.6%  - 8/2024: A1c 6.4% started by PCP on Rybelsus 3 mg daily on 8/16/24 and remain on janumet but taking it OD

## 2024-08-20 NOTE — DATA REVIEWED
[FreeTextEntry1] : 6/10/21:  Tg 160 glucose 101 HBA1c 5.9% crea 0.83    GFR 76  GGT 96  ALK phos 68 AST 11 ALt 17 TSH 5.18  FT4 1.4  25 vit D 43   12/2/21: tsh 1.81  Ft4 1.6  TT3 78 hb 12.6 A1c 6% crea 0.71 GFR 91  tg 174 ALT 30 AST 14  9/2022: A1c 6.1% LDL 92 Tg 145 glucose 108 crea 0.69  GFR 97 TSH 1.1 25 vit D 51   3/2023:  A1c 6.4% glucose 134   crea 0.72  GFR 93  crea 0.72   AST 11  ALT 18 LDL 74  Tg 112    U/ A positive no bacteria ?  TSH 1.22 ft4 1.6  8/2023: A1c 6.3% LDL 86  Tg 159 glucose 125 crea 0.72   GFR 93  11/2023: A1c 6.4% ACR 34 LDL 89 Tg 194 glucose 122  crea 0.72  GFR 93 TSH 1.25  ft4 1.6   4/2024: A1c 6.6%  glucose 116  LDL 75   crea 0.72  GFR 93 TSH 1.01  Ft4 1.6  ACR 43   8/2024 : TSH 0.62  Ft4 1.5  t4 9.4A1c 6.5% LDL 97  Tg 193 glucose 136  crea 0.8  GFR 82 Tpo 25 Tg antibodies negative

## 2024-08-20 NOTE — REVIEW OF SYSTEMS
[Diarrhea] : diarrhea [As Noted in HPI] : as noted in HPI [All other systems negative] : All other systems negative [Fatigue] : no fatigue [Recent Weight Gain (___ Lbs)] : no recent weight gain [Recent Weight Loss (___ Lbs)] : no recent weight loss [Visual Field Defect] : no visual field defect [Blurred Vision] : no blurred vision [Dysphagia] : no dysphagia [Neck Pain] : no neck pain [Dysphonia] : no dysphonia [Chest Pain] : no chest pain [Palpitations] : no palpitations [Fast Heart Rate] : heart rate is not fast [Lower Ext Edema] : no lower extremity edema [Shortness Of Breath] : no shortness of breath [SOB on Exertion] : no shortness of breath on exertion [Nausea] : no nausea [Constipation] : no constipation [Vomiting] : no vomiting

## 2024-08-20 NOTE — REASON FOR VISIT
[Home] : at home, [unfilled] , at the time of the visit. [Medical Office: ([facility])___] : at the medical office located in  [Patient] : the patient [Follow - Up] : a follow-up visit [Hypothyroidism] : hypothyroidism

## 2024-08-20 NOTE — PHYSICAL EXAM
[Alert] : alert [Healthy Appearance] : healthy appearance [No Acute Distress] : no acute distress [No Proptosis] : no proptosis [No Lid Lag] : no lid lag [Thyroid Not Enlarged] : the thyroid was not enlarged [No Thyroid Nodules] : no palpable thyroid nodules [No Respiratory Distress] : no respiratory distress [No Accessory Muscle Use] : no accessory muscle use [Clear to Auscultation] : lungs were clear to auscultation bilaterally [Normal S1, S2] : normal S1 and S2 [No Murmurs] : no murmurs [Regular Rhythm] : with a regular rhythm [No Edema] : no peripheral edema [No CVA Tenderness] : no ~M costovertebral angle tenderness [No Stigmata of Cushings Syndrome] : no stigmata of Cushings Syndrome [Cranial Nerves Intact] : cranial nerves 2-12 were intact [No Motor Deficits] : the motor exam was normal [No Tremors] : no tremors [Oriented x3] : oriented to person, place, and time [de-identified] : BMI31

## 2024-10-15 ENCOUNTER — APPOINTMENT (OUTPATIENT)
Dept: OBGYN | Facility: CLINIC | Age: 64
End: 2024-10-15
Payer: COMMERCIAL

## 2024-10-15 VITALS
SYSTOLIC BLOOD PRESSURE: 138 MMHG | DIASTOLIC BLOOD PRESSURE: 62 MMHG | HEIGHT: 61 IN | WEIGHT: 163 LBS | BODY MASS INDEX: 30.78 KG/M2

## 2024-10-15 DIAGNOSIS — Z01.419 ENCOUNTER FOR GYNECOLOGICAL EXAMINATION (GENERAL) (ROUTINE) W/OUT ABNORMAL FINDINGS: ICD-10-CM

## 2024-10-15 PROCEDURE — 99397 PER PM REEVAL EST PAT 65+ YR: CPT | Mod: 25

## 2024-10-15 PROCEDURE — 99459 PELVIC EXAMINATION: CPT

## 2024-10-21 ENCOUNTER — APPOINTMENT (OUTPATIENT)
Dept: ENDOCRINOLOGY | Facility: CLINIC | Age: 64
End: 2024-10-21
Payer: COMMERCIAL

## 2024-10-21 VITALS
WEIGHT: 163 LBS | HEART RATE: 80 BPM | DIASTOLIC BLOOD PRESSURE: 80 MMHG | SYSTOLIC BLOOD PRESSURE: 128 MMHG | OXYGEN SATURATION: 98 % | HEIGHT: 61 IN | BODY MASS INDEX: 30.78 KG/M2

## 2024-10-21 DIAGNOSIS — E11.9 TYPE 2 DIABETES MELLITUS W/OUT COMPLICATIONS: ICD-10-CM

## 2024-10-21 DIAGNOSIS — E03.9 HYPOTHYROIDISM, UNSPECIFIED: ICD-10-CM

## 2024-10-21 DIAGNOSIS — E78.5 HYPERLIPIDEMIA, UNSPECIFIED: ICD-10-CM

## 2024-10-21 PROCEDURE — 99213 OFFICE O/P EST LOW 20 MIN: CPT

## 2025-01-21 ENCOUNTER — APPOINTMENT (OUTPATIENT)
Dept: ENDOCRINOLOGY | Facility: CLINIC | Age: 65
End: 2025-01-21
Payer: COMMERCIAL

## 2025-01-21 VITALS
WEIGHT: 160 LBS | HEART RATE: 65 BPM | OXYGEN SATURATION: 99 % | BODY MASS INDEX: 30.21 KG/M2 | HEIGHT: 61 IN | DIASTOLIC BLOOD PRESSURE: 80 MMHG | SYSTOLIC BLOOD PRESSURE: 120 MMHG

## 2025-01-21 DIAGNOSIS — E78.5 HYPERLIPIDEMIA, UNSPECIFIED: ICD-10-CM

## 2025-01-21 DIAGNOSIS — E03.9 HYPOTHYROIDISM, UNSPECIFIED: ICD-10-CM

## 2025-01-21 DIAGNOSIS — E11.9 TYPE 2 DIABETES MELLITUS W/OUT COMPLICATIONS: ICD-10-CM

## 2025-01-21 PROCEDURE — 99213 OFFICE O/P EST LOW 20 MIN: CPT

## 2025-01-21 RX ORDER — SEMAGLUTIDE 1.34 MG/ML
2 INJECTION, SOLUTION SUBCUTANEOUS
Refills: 0 | Status: ACTIVE | COMMUNITY

## 2025-01-21 RX ORDER — METFORMIN ER 500 MG 500 MG/1
500 TABLET ORAL
Qty: 180 | Refills: 1 | Status: ACTIVE | COMMUNITY
Start: 2025-01-21 | End: 1900-01-01

## 2025-03-28 RX ORDER — TIRZEPATIDE 5 MG/.5ML
5 INJECTION, SOLUTION SUBCUTANEOUS
Qty: 3 | Refills: 1 | Status: ACTIVE | COMMUNITY
Start: 2025-03-28 | End: 1900-01-01

## 2025-07-07 ENCOUNTER — RX RENEWAL (OUTPATIENT)
Age: 65
End: 2025-07-07

## 2025-07-14 ENCOUNTER — APPOINTMENT (OUTPATIENT)
Dept: ENDOCRINOLOGY | Facility: CLINIC | Age: 65
End: 2025-07-14

## 2025-07-28 ENCOUNTER — APPOINTMENT (OUTPATIENT)
Dept: ENDOCRINOLOGY | Facility: CLINIC | Age: 65
End: 2025-07-28
Payer: COMMERCIAL

## 2025-07-28 VITALS
OXYGEN SATURATION: 98 % | HEART RATE: 82 BPM | HEIGHT: 61 IN | SYSTOLIC BLOOD PRESSURE: 120 MMHG | DIASTOLIC BLOOD PRESSURE: 78 MMHG | WEIGHT: 164 LBS | BODY MASS INDEX: 30.96 KG/M2

## 2025-07-28 DIAGNOSIS — Z78.9 OTHER SPECIFIED HEALTH STATUS: ICD-10-CM

## 2025-07-28 DIAGNOSIS — E78.5 HYPERLIPIDEMIA, UNSPECIFIED: ICD-10-CM

## 2025-07-28 DIAGNOSIS — E03.9 HYPOTHYROIDISM, UNSPECIFIED: ICD-10-CM

## 2025-07-28 DIAGNOSIS — E66.9 OBESITY, UNSPECIFIED: ICD-10-CM

## 2025-07-28 DIAGNOSIS — Z82.49 FAMILY HISTORY OF ISCHEMIC HEART DISEASE AND OTHER DISEASES OF THE CIRCULATORY SYSTEM: ICD-10-CM

## 2025-07-28 DIAGNOSIS — E11.9 TYPE 2 DIABETES MELLITUS W/OUT COMPLICATIONS: ICD-10-CM

## 2025-07-28 DIAGNOSIS — Z80.7 FAMILY HISTORY OF OTHER MALIGNANT NEOPLASMS OF LYMPHOID, HEMATOPOIETIC AND RELATED TISSUES: ICD-10-CM

## 2025-07-28 PROCEDURE — 99214 OFFICE O/P EST MOD 30 MIN: CPT

## 2025-07-28 RX ORDER — TIRZEPATIDE 10 MG/.5ML
10 INJECTION, SOLUTION SUBCUTANEOUS
Refills: 0 | Status: ACTIVE | COMMUNITY